# Patient Record
Sex: FEMALE | Race: ASIAN | NOT HISPANIC OR LATINO
[De-identification: names, ages, dates, MRNs, and addresses within clinical notes are randomized per-mention and may not be internally consistent; named-entity substitution may affect disease eponyms.]

---

## 2019-10-23 PROBLEM — Z00.00 ENCOUNTER FOR PREVENTIVE HEALTH EXAMINATION: Status: ACTIVE | Noted: 2019-10-23

## 2019-10-24 ENCOUNTER — APPOINTMENT (OUTPATIENT)
Dept: PHYSICAL MEDICINE AND REHAB | Facility: CLINIC | Age: 34
End: 2019-10-24

## 2023-01-12 ENCOUNTER — NON-APPOINTMENT (OUTPATIENT)
Age: 38
End: 2023-01-12

## 2023-01-12 ENCOUNTER — APPOINTMENT (OUTPATIENT)
Dept: ORTHOPEDIC SURGERY | Facility: CLINIC | Age: 38
End: 2023-01-12
Payer: COMMERCIAL

## 2023-01-12 ENCOUNTER — TRANSCRIPTION ENCOUNTER (OUTPATIENT)
Age: 38
End: 2023-01-12

## 2023-01-12 VITALS
DIASTOLIC BLOOD PRESSURE: 57 MMHG | OXYGEN SATURATION: 99 % | WEIGHT: 120 LBS | HEIGHT: 66.5 IN | HEART RATE: 77 BPM | SYSTOLIC BLOOD PRESSURE: 90 MMHG | BODY MASS INDEX: 19.06 KG/M2

## 2023-01-12 DIAGNOSIS — M25.562 PAIN IN LEFT KNEE: ICD-10-CM

## 2023-01-12 PROCEDURE — 99203 OFFICE O/P NEW LOW 30 MIN: CPT

## 2023-01-12 PROCEDURE — 73564 X-RAY EXAM KNEE 4 OR MORE: CPT | Mod: LT

## 2023-01-12 PROCEDURE — 72170 X-RAY EXAM OF PELVIS: CPT

## 2023-01-15 PROBLEM — M25.562 LEFT KNEE PAIN: Status: ACTIVE | Noted: 2023-01-12

## 2023-01-15 NOTE — REVIEW OF SYSTEMS
[Joint Pain] : joint pain [Negative] : Endocrine [Joint Stiffness] : no joint stiffness [Joint Swelling] : no joint swelling [Fever] : no fever [Chills] : no chills

## 2023-01-15 NOTE — HISTORY OF PRESENT ILLNESS
[de-identified] : Ms. Maloney is a 37-year-old female presents to the office for evaluation of her left knee pain.  Patient had a fall while skiing about 2 weeks ago, she felt her knee twist during the fall.  She states that the knee may have externally rotated during fall.  She is able to ski down the mountain following the fall, but then stopped skiing for the rest of the trip.  She felt knee pain and some swelling that night.  Patient reports pain with bending or straightening of the leg.  She states that overall, the pain is improving.  She has been careful when she is walking and reports no significant instability.  She does have pain when changing positions of the knee and going from a seated to a standing position.  No history of knee injuries.  Patient tried Advil for one day.  No fevers or chills.

## 2023-01-15 NOTE — PHYSICAL EXAM
[de-identified] : Constitutional: 37 year old female, alert and oriented, cooperative, in no acute distress.\par \par HEENT \par NC/AT.  Appearance: symmetric\par \par Neck/Back\par Straight without deformity or instability.  Good ROM.\par \par Chest/Respiratory \par Respiratory effort: no intercostal retractions or use of accessory muscles. Nonlabored Breathing\par \par Skin \par On inspection, warm and dry without rashes or lesions.\par \par Mental Status: \par Judgment, insight: intact\par Orientation: oriented to time, place, and person\par \par Neurological:\par Sensory and Motor are grossly intact throughout\par \par Left Knee\par \par Inspection:\par     Skin intact, no rashes or lesions\par     No Effusion\par     Non-tender to palpation over tibial tubercle, patella, lateral joint line, and pes insertion.\par     Tenderness over the MCL femoral origin. Mild medial joint line tenderness\par \par Range of Motion:\par 	Extension - 0 degrees\par 	Flexion - 120 degrees\par 	Extensor lag: None\par \par Stability:\par      Demonstrates no Varus or Valgus instability\par      Negative Anterior or Posterior drawer.\par      Negative Lachman's\par      Negative McMurrays\par \par Patella: stable, tracks well. \par \par Neurologic Exam\par     Motor intact including 5/5 Extensor Hallucis Longus, 5/5 Flexor Hallucis Longus, 5/5 Tibialis Anterior and 5/5 Gastrocnemius\par     Sensation Intact to Light Touch including Saphenous, Sural, Superficial Peroneal, Deep Peroneal, Tibial nerve distributions\par \par Vascular Exam\par     Foot is warm and well perfused with 2+ Dorsalis Pedis Pulse \par \par No pain with range of motion of the bilateral hips or right knee. No lumbar paraspinal muscle tenderness.  [de-identified] : XRay:  XRays of the Left Knee (4 Views) taken in the office today and discussed with the patient. XRays demonstrate no obvious fracture or dislocation. There is no significant evidence of osteoarthritis or osteophyte formation. (my personal interpretation).\par \par XRay:  XRays of the Pelvis (1 View) taken in the office today and discussed with the patient. XRays demonstrate no obvious fracture or dislocation. There is no significant evidence of osteoarthritis or osteophyte formation. (my personal interpretation). \par

## 2023-01-15 NOTE — DISCUSSION/SUMMARY
[de-identified] : Ms. Maloney is a 37-year-old female presents the office for evaluation of her left knee pain.  Patient has been experiencing left knee pain since a fall while skiing about 2 weeks ago.  She felt her knee externally rotate during the fall.  X-rays showed no obvious fractures or dislocations.  Examination showed some tenderness over the MCL near the femoral origin.  Discussed with the patient examination and imaging findings.  Discussed with the patient the potential etiologies of her knee pain including, but not limited to, ligament injury, meniscus injury, knee contusion, knee sprain, and knee strain.  Discussed with patient that due to her continued pain, mechanism of injury, and pain over the MCL, it would be beneficial to undergo further evaluation with an MRI.  Left knee MRI was ordered.  Discussed providing further support with a left knee brace.  Patient did not want a brace at this time.  Discussed rest and activity modifications for the left knee.  Patient will follow-up in 2 to 3 weeks for reevaluation and management, pending results of the MRI.  Patient understanding and in agreement the plan.  All questions answered.\par \par Plan:\par - Left Knee MRI\par - Rest, Activity Modifications\par - Follow up in in 2-3 weeks for reevaluation and management, pending results of the MRI

## 2023-01-30 ENCOUNTER — APPOINTMENT (OUTPATIENT)
Dept: ORTHOPEDIC SURGERY | Facility: CLINIC | Age: 38
End: 2023-01-30
Payer: COMMERCIAL

## 2023-01-30 VITALS
DIASTOLIC BLOOD PRESSURE: 62 MMHG | WEIGHT: 120 LBS | SYSTOLIC BLOOD PRESSURE: 91 MMHG | BODY MASS INDEX: 19.06 KG/M2 | HEART RATE: 77 BPM | HEIGHT: 66.5 IN | OXYGEN SATURATION: 98 %

## 2023-01-30 PROCEDURE — 99213 OFFICE O/P EST LOW 20 MIN: CPT

## 2023-02-04 NOTE — HISTORY OF PRESENT ILLNESS
[de-identified] : Ms. Maloney is a 37-year-old female who presented to the office for follow-up of her left knee pain.  Patient experienced a skiing injury, twisting her knee during the fall.  She was seen in the office on 1/12/2023 and ordered for an MRI.  MRI showed a proximal MCL tear.  Patient states that her pain has been improving, but she continues to have some medial knee pain in certain positions, especially with valgus stress.  Patient had tried a brace, but it put pressure on the MCL and caused pain.  She has not tried anti-inflammatories or physical therapy.  She states that she is not walking normally at this time.  She reports some pain with stairs and states that she is dragging her left leg with the stairs.  No subsequent trauma.  No fevers or chills.

## 2023-02-04 NOTE — PHYSICAL EXAM
[de-identified] : Constitutional: 37 year old female, alert and oriented, cooperative, in no acute distress.\par \par HEENT \par NC/AT. Appearance: symmetric\par \par Neck/Back\par Straight without deformity or instability. Good ROM.\par \par Chest/Respiratory \par Respiratory effort: no intercostal retractions or use of accessory muscles. Nonlabored Breathing\par \par Skin \par On inspection, warm and dry without rashes or lesions.\par \par Mental Status: \par Judgment, insight: intact\par Orientation: oriented to time, place, and person\par \par Neurological:\par Sensory and Motor are grossly intact throughout\par \par Left Knee\par \par Inspection:\par  Skin intact, no rashes or lesions\par  No Effusion\par  Non-tender to palpation over tibial tubercle, patella, lateral joint line, and pes insertion.\par  Tenderness over the MCL femoral origin\par \par Range of Motion: No pain with knee range of motion\par 	Extension - 0 degrees\par 	Flexion - 120 degrees\par 	Extensor lag: None\par \par Stability:\par  Demonstrates no Varus instability. Mild opening with valgus stress\par  Negative Anterior or Posterior drawer.\par  Negative Lachman's\par  Negative McMurrays\par \par Patella: stable, tracks well. \par \par Neurologic Exam\par  Motor intact including 5/5 Extensor Hallucis Longus, 5/5 Flexor Hallucis Longus, 5/5 Tibialis Anterior and 5/5 Gastrocnemius\par  Sensation Intact to Light Touch including Saphenous, Sural, Superficial Peroneal, Deep Peroneal, Tibial nerve distributions\par \par Vascular Exam\par  Foot is warm and well perfused with 2+ Dorsalis Pedis Pulse \par \par No pain with range of motion of the bilateral hips or right knee. No lumbar paraspinal muscle tenderness.  [de-identified] : Bernardo DARCY: 1/24/2023\par Impression:\par 1.  Sprain and partial tearing of the proximal medial collateral ligament.\par 2.  Soft tissue edema in the superolateral aspect of Hoffa's fat pad, probably related to the presence of patellar tracking abnormality

## 2023-02-04 NOTE — DISCUSSION/SUMMARY
[de-identified] : Ms. Maloney is a 37-year-old female presents to the office for follow-up of her left knee pain.  Patient experienced a left knee injury while skiing, twisting the knee.  MRI showed a left proximal MCL partial tear.  Examination showed tenderness over the medial femoral condyle near the MCL origin, as well as mild medial sided opening with valgus stress.  Discussed with the patient examination and imaging findings.  Discussed with patient the management of MCL tears, including physical therapy, anti-inflammatories, and bracing.  Patient was given a referral to physical therapy.  She will take over-the-counter anti-inflammatories as needed.  Patient was given a left hinged knee brace.  Patient will follow-up in 4 to 6 weeks for reevaluation and management.  Patient understanding and in agreement plan.  All questions answered.\par \par Plan:\par -Physical therapy\par -Continue over-the-counter anti-inflammatories as needed\par -Left lower extremity: Weightbearing as tolerated in hinged knee brace\par -Follow-up in 4 to 6 weeks for reevaluation and management

## 2023-02-04 NOTE — REVIEW OF SYSTEMS
[Joint Pain] : joint pain [Joint Stiffness] : no joint stiffness [Joint Swelling] : no joint swelling [Fever] : no fever [Chills] : no chills [Negative] : Endocrine

## 2023-03-16 ENCOUNTER — APPOINTMENT (OUTPATIENT)
Dept: ORTHOPEDIC SURGERY | Facility: CLINIC | Age: 38
End: 2023-03-16
Payer: COMMERCIAL

## 2023-03-23 ENCOUNTER — APPOINTMENT (OUTPATIENT)
Dept: ORTHOPEDIC SURGERY | Facility: CLINIC | Age: 38
End: 2023-03-23
Payer: COMMERCIAL

## 2023-03-23 DIAGNOSIS — S83.419A SPRAIN OF MEDIAL COLLATERAL LIGAMENT OF UNSPECIFIED KNEE, INITIAL ENCOUNTER: ICD-10-CM

## 2023-03-23 PROCEDURE — 99213 OFFICE O/P EST LOW 20 MIN: CPT

## 2023-03-25 PROBLEM — S83.419A TEAR OF MCL (MEDIAL COLLATERAL LIGAMENT) OF KNEE: Status: ACTIVE | Noted: 2023-01-30

## 2023-03-25 NOTE — HISTORY OF PRESENT ILLNESS
[de-identified] : Ms. Maloney is a 38-year-old female who presented to the office for follow-up of her left knee pain.  Patient experienced a skiing injury, twisting her knee during the fall.  She was seen in the office on 1/12/2023 and ordered for an MRI.  MRI showed a proximal MCL tear.  He states that her knee pain has significantly improved.  She did a few sessions of physical therapy, which helped.  Patient use the brace for about 1 to 2 days.  She reports some continued tenderness over the medial knee, but overall pain has significantly improved.  No subsequent injuries.

## 2023-03-25 NOTE — REVIEW OF SYSTEMS
[Joint Stiffness] : no joint stiffness [Joint Swelling] : no joint swelling [Chills] : no chills [Fever] : no fever

## 2023-03-25 NOTE — DISCUSSION/SUMMARY
[de-identified] : Ms. Maloney is a 37-year-old female presents to the office for follow-up of her left knee pain.  Patient experienced a left knee injury while skiing, twisting the knee.  MRI showed a left proximal MCL partial tear.  Examination showed mild tenderness over the medial femoral condyle near the MCL origin.  Discussed with the patient examination and imaging findings.  Patient's knee pain has significantly improved and she is currently doing well.  She does have some tenderness over the medial knee.  Discussed the management of her knee injury at this time.  Patient will continue physical therapy as needed or home exercises.  She will start activities as tolerated.  Discussed activity modifications, avoiding significant side to side motions/cutting activities.  Discussed avoiding skiing at this time.  Patient will use her brace as needed.  She will take over-the-counter anti-inflammatories as needed for pain control.  Patient will follow-up as needed for reevaluation and management.  Patient understanding and in agreement the plan.  All questions answered.\par \par Plan:\par -Physical therapy as needed\par -Home exercise\par -Activity as tolerated\par -Brace as needed\par -Continue over-the-counter anti-inflammatories as needed\par -Follow-up as needed for reevaluation and management

## 2023-03-25 NOTE — PHYSICAL EXAM
[de-identified] : Constitutional: 38 year old female, alert and oriented, cooperative, in no acute distress.\par \par HEENT \par NC/AT. Appearance: symmetric\par \par Neck/Back\par Straight without deformity or instability. Good ROM.\par \par Chest/Respiratory \par Respiratory effort: no intercostal retractions or use of accessory muscles. Nonlabored Breathing\par \par Skin \par On inspection, warm and dry without rashes or lesions.\par \par Mental Status: \par Judgment, insight: intact\par Orientation: oriented to time, place, and person\par \par Neurological:\par Sensory and Motor are grossly intact throughout\par \par Left Knee\par \par Inspection:\par  Skin intact, no rashes or lesions\par  No Effusion\par  Non-tender to palpation over tibial tubercle, patella, lateral joint line, and pes insertion.\par  Minimal/Mild Tenderness over the MCL femoral origin\par \par Range of Motion: No pain with knee range of motion\par 	Extension - 0 degrees\par 	Flexion - 120 degrees\par 	Extensor lag: None\par \par Stability:\par  Demonstrates no Varus or Valgus Instability\par  Negative Anterior or Posterior drawer.\par  Negative Lachman's\par  Negative McMurrays\par \par Patella: stable, tracks well. \par \par Neurologic Exam\par  Motor intact including 5/5 Extensor Hallucis Longus, 5/5 Flexor Hallucis Longus, 5/5 Tibialis Anterior and 5/5 Gastrocnemius\par  Sensation Intact to Light Touch including Saphenous, Sural, Superficial Peroneal, Deep Peroneal, Tibial nerve distributions\par \par Vascular Exam\par  Foot is warm and well perfused with 2+ Dorsalis Pedis Pulse \par \par No pain with range of motion of the bilateral hips or right knee. No lumbar paraspinal muscle tenderness.  [de-identified] : Bernardo DARCY: 1/24/2023\par Impression:\par 1.  Sprain and partial tearing of the proximal medial collateral ligament.\par 2.  Soft tissue edema in the superolateral aspect of Hoffa's fat pad, probably related to the presence of patellar tracking abnormality

## 2025-04-23 ENCOUNTER — DOCTOR'S OFFICE (OUTPATIENT)
Facility: LOCATION | Age: 40
Setting detail: OPHTHALMOLOGY
End: 2025-04-23
Payer: COMMERCIAL

## 2025-04-23 ENCOUNTER — RX ONLY (RX ONLY)
Age: 40
End: 2025-04-23

## 2025-04-23 DIAGNOSIS — H10.45: ICD-10-CM

## 2025-04-23 PROCEDURE — 92004 COMPRE OPH EXAM NEW PT 1/>: CPT | Performed by: OPHTHALMOLOGY

## 2025-04-23 ASSESSMENT — VISUAL ACUITY
OD_BCVA: 20/20
OS_BCVA: 20/20-1

## 2025-04-23 ASSESSMENT — REFRACTION_AUTOREFRACTION
OS_AXIS: 152
OS_CYLINDER: -0.75
OD_CYLINDER: -0.50
OD_AXIS: 092
OD_SPHERE: -1.00
OS_SPHERE: -0.50

## 2025-04-23 ASSESSMENT — CONFRONTATIONAL VISUAL FIELD TEST (CVF)
OD_FINDINGS: FULL
OS_FINDINGS: FULL

## 2025-04-23 ASSESSMENT — KERATOMETRY
OD_AXISANGLE_DEGREES: 060
OS_AXISANGLE_DEGREES: 091
OD_K2POWER_DIOPTERS: 43.00
OS_K2POWER_DIOPTERS: 43.25
OS_K1POWER_DIOPTERS: 42.25
OD_K1POWER_DIOPTERS: 42.50